# Patient Record
Sex: MALE | Race: WHITE
[De-identification: names, ages, dates, MRNs, and addresses within clinical notes are randomized per-mention and may not be internally consistent; named-entity substitution may affect disease eponyms.]

---

## 2018-04-25 ENCOUNTER — HOSPITAL ENCOUNTER (EMERGENCY)
Dept: HOSPITAL 74 - JER | Age: 38
Discharge: HOME | End: 2018-04-25
Payer: COMMERCIAL

## 2018-04-25 VITALS — TEMPERATURE: 97.6 F | HEART RATE: 80 BPM | DIASTOLIC BLOOD PRESSURE: 97 MMHG | SYSTOLIC BLOOD PRESSURE: 123 MMHG

## 2018-04-25 VITALS — BODY MASS INDEX: 29.5 KG/M2
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
## 2018-04-25 DIAGNOSIS — T14.8XXA: Primary | ICD-10-CM
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
## 2018-04-25 NOTE — PDOC
History of Present Illness





- General


Chief Complaint: Pain


Stated Complaint: LLQ PAIN/YPD


Time Seen by Provider: 04/25/18 04:40


History Source: Patient





Past History





- Past Medical History


Allergies/Adverse Reactions: 


 Allergies











Allergy/AdvReac Type Severity Reaction Status Date / Time


 


No Known Allergies Allergy   Verified 04/25/18 04:40











Home Medications: 


Ambulatory Orders





NK [No Known Home Medication]  04/25/18 











- Surgical History


Abdominal Surgery: Yes (appendectomy)





- Immunization History


Td Vaccination: Yes


Immunization Up to Date: Yes





- Suicide/Smoking/Psychosocial Hx


Smoking Status: No


Smoking History: Never smoked


Years of Tobacco Use: 0


Have you smoked in the past 12 months: No


Number of Cigarettes Smoked Daily: 0


Cigars Per Day: 0


Information on smoking cessation initiated: No


Hx Alcohol Use: No


Drug/Substance Use Hx: No


Substance Use Type: None





*Physical Exam





- Vital Signs


 Last Vital Signs











Temp Pulse Resp BP Pulse Ox


 


 97.6 F   80   20   123/97   99 


 


 04/25/18 04:40  04/25/18 04:40  04/25/18 04:40  04/25/18 04:40  04/25/18 04:40














Medical Decision Making





- Medical Decision Making





04/25/18 04:59


Dr. Child: The scribe's documentation has been prepared under my direction 

and personally reviewed by me in its entirery. I confirm that the note above 

accurately reflects all work, treatment, procedures, and medical decision 

making performed by me.





*DC/Admit/Observation/Transfer


Diagnosis at time of Disposition: 


 Muscle tear





Inguinal hernia


Qualifiers:


 Laterality: unilateral Recurrence: non-recurrent 





Groin strain


Qualifiers:


 Laterality: left 








- Discharge Dispostion


Disposition: HOME


Condition at time of disposition: Stable


Admit: No





- Referrals


Referrals: 


Thom Avelar MD [Primary Care Provider] - 


Ad Panchal MD [Staff Physician] - 


Chris Rojas MD [Staff Physician] - 





- Patient Instructions


Printed Discharge Instructions:  Groin Hernia -- Adult, DI for Groin Strain


Additional Instructions: 


Please follow up with your doctor for general surgical consultation.  Use 

scrotal support when working out or doing any heavy lifting.  Rest for the next 

2 days 





- Post Discharge Activity


Forms/Work/School Notes:  Back to Work

## 2018-12-03 ENCOUNTER — HOSPITAL ENCOUNTER (EMERGENCY)
Dept: HOSPITAL 74 - JER | Age: 38
Discharge: HOME | End: 2018-12-03
Payer: COMMERCIAL

## 2018-12-03 VITALS — DIASTOLIC BLOOD PRESSURE: 97 MMHG | HEART RATE: 90 BPM | SYSTOLIC BLOOD PRESSURE: 135 MMHG | TEMPERATURE: 98.1 F

## 2018-12-03 VITALS — BODY MASS INDEX: 31 KG/M2
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
## 2018-12-03 DIAGNOSIS — S60.051A: Primary | ICD-10-CM
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
